# Patient Record
Sex: FEMALE | Race: WHITE | NOT HISPANIC OR LATINO | URBAN - METROPOLITAN AREA
[De-identification: names, ages, dates, MRNs, and addresses within clinical notes are randomized per-mention and may not be internally consistent; named-entity substitution may affect disease eponyms.]

---

## 2023-10-06 DIAGNOSIS — F41.9 ANXIETY: Primary | ICD-10-CM

## 2023-10-06 RX ORDER — SERTRALINE HYDROCHLORIDE 100 MG/1
100 TABLET, FILM COATED ORAL DAILY
Qty: 180 TABLET | Refills: 0 | Status: SHIPPED | OUTPATIENT
Start: 2023-10-06

## 2024-05-28 ENCOUNTER — TELEPHONE (OUTPATIENT)
Dept: PRIMARY CARE | Facility: CLINIC | Age: 54
End: 2024-05-28

## 2024-08-19 ENCOUNTER — OFFICE VISIT (OUTPATIENT)
Dept: PRIMARY CARE | Facility: CLINIC | Age: 54
End: 2024-08-19
Payer: COMMERCIAL

## 2024-08-19 VITALS
WEIGHT: 200.4 LBS | RESPIRATION RATE: 16 BRPM | DIASTOLIC BLOOD PRESSURE: 74 MMHG | SYSTOLIC BLOOD PRESSURE: 114 MMHG | BODY MASS INDEX: 34.21 KG/M2 | TEMPERATURE: 97.3 F | HEART RATE: 92 BPM | OXYGEN SATURATION: 97 % | HEIGHT: 64 IN

## 2024-08-19 DIAGNOSIS — Z12.11 ENCOUNTER FOR SCREENING FOR MALIGNANT NEOPLASM OF COLON: ICD-10-CM

## 2024-08-19 DIAGNOSIS — E78.49 FAMILIAL HYPERLIPIDEMIA: ICD-10-CM

## 2024-08-19 DIAGNOSIS — F32.A DEPRESSION, UNSPECIFIED DEPRESSION TYPE: Primary | ICD-10-CM

## 2024-08-19 DIAGNOSIS — Z12.39 BREAST SCREENING: ICD-10-CM

## 2024-08-19 PROBLEM — M19.042 PRIMARY OSTEOARTHRITIS, LEFT HAND: Status: ACTIVE | Noted: 2024-08-19

## 2024-08-19 PROBLEM — E55.9 VITAMIN D DEFICIENCY: Status: ACTIVE | Noted: 2024-08-19

## 2024-08-19 PROBLEM — M18.0 ARTHRITIS OF CARPOMETACARPAL (CMC) JOINT OF BOTH THUMBS: Status: ACTIVE | Noted: 2024-08-19

## 2024-08-19 PROBLEM — F41.9 ANXIETY: Status: ACTIVE | Noted: 2024-08-19

## 2024-08-19 PROBLEM — E78.00 PURE HYPERCHOLESTEROLEMIA: Status: ACTIVE | Noted: 2024-08-19

## 2024-08-19 PROBLEM — M19.041 PRIMARY OSTEOARTHRITIS, RIGHT HAND: Status: ACTIVE | Noted: 2024-08-19

## 2024-08-19 PROBLEM — M19.031 PRIMARY OSTEOARTHRITIS, RIGHT WRIST: Status: ACTIVE | Noted: 2024-08-19

## 2024-08-19 PROBLEM — E78.5 HYPERLIPIDEMIA: Status: ACTIVE | Noted: 2024-08-19

## 2024-08-19 PROCEDURE — 99213 OFFICE O/P EST LOW 20 MIN: CPT | Performed by: NURSE PRACTITIONER

## 2024-08-19 PROCEDURE — 3008F BODY MASS INDEX DOCD: CPT | Performed by: NURSE PRACTITIONER

## 2024-08-19 RX ORDER — SERTRALINE HYDROCHLORIDE 100 MG/1
100 TABLET, FILM COATED ORAL DAILY
Qty: 90 TABLET | Refills: 2 | Status: SHIPPED | OUTPATIENT
Start: 2024-08-19 | End: 2025-05-16

## 2024-08-19 ASSESSMENT — PATIENT HEALTH QUESTIONNAIRE - PHQ9
1. LITTLE INTEREST OR PLEASURE IN DOING THINGS: NOT AT ALL
SUM OF ALL RESPONSES TO PHQ9 QUESTIONS 1 AND 2: 0
2. FEELING DOWN, DEPRESSED OR HOPELESS: NOT AT ALL

## 2024-08-19 ASSESSMENT — ENCOUNTER SYMPTOMS: ARTHRALGIAS: 1

## 2024-08-19 ASSESSMENT — PAIN SCALES - GENERAL: PAINLEVEL: 0-NO PAIN

## 2024-08-19 NOTE — PROGRESS NOTES
"Subjective   Patient ID: Nola Lakhani is a 53 y.o. female who presents for Med Refill (Pt here to discuss going back on zoloft. Pt weaned herself off but feel she still needs it).    Weaned herself zoloft has had  some to stress has sahara off needs to go back on,     Med Refill  This is a chronic problem. The current episode started more than 1 year ago. The problem occurs constantly. The problem has been waxing and waning. Associated symptoms include arthralgias.   Anxiety             Review of Systems   Musculoskeletal:  Positive for arthralgias.   Psychiatric/Behavioral:          More stress depressed worsening  not suicidal        Objective   /74   Pulse 92   Temp 36.3 °C (97.3 °F)   Resp 16   Ht 1.626 m (5' 4\")   Wt 90.9 kg (200 lb 6.4 oz)   SpO2 97%   BMI 34.40 kg/m²     Physical Exam  Constitutional:       Appearance: Normal appearance. She is obese.   Cardiovascular:      Rate and Rhythm: Normal rate and regular rhythm.      Pulses: Normal pulses.      Heart sounds: Normal heart sounds.   Neurological:      Mental Status: She is alert.   Psychiatric:         Behavior: Behavior normal.      Comments: Discussed stresses at  home       Assessment/Plan   Problem List Items Addressed This Visit             ICD-10-CM    Depressed - Primary F32.A    Relevant Medications    sertraline (Zoloft) 100 mg tablet     Other Visit Diagnoses         Codes    Breast screening     Z12.39    Relevant Orders    BI mammo bilateral screening tomosynthesis    Familial hyperlipidemia     E78.49    Relevant Orders    CT cardiac scoring wo IV contrast    Encounter for screening for malignant neoplasm of colon     Z12.11    Relevant Orders    Cologuard® colon cancer screening          Restart zoloft take 50mg for 2 weeks increase then to 100mg , discussed need for preventive  screenings. Recheck in 6 weeks for med check.      "

## 2025-08-30 DIAGNOSIS — F32.A DEPRESSION, UNSPECIFIED DEPRESSION TYPE: ICD-10-CM

## 2025-09-02 RX ORDER — SERTRALINE HYDROCHLORIDE 100 MG/1
100 TABLET, FILM COATED ORAL DAILY
Qty: 90 TABLET | Refills: 0 | Status: SHIPPED | OUTPATIENT
Start: 2025-09-02